# Patient Record
Sex: MALE | URBAN - METROPOLITAN AREA
[De-identification: names, ages, dates, MRNs, and addresses within clinical notes are randomized per-mention and may not be internally consistent; named-entity substitution may affect disease eponyms.]

---

## 2022-03-01 ENCOUNTER — APPOINTMENT (OUTPATIENT)
Dept: URBAN - METROPOLITAN AREA CLINIC 232 | Age: 27
Setting detail: DERMATOLOGY
End: 2022-03-01

## 2022-03-01 DIAGNOSIS — L94.2 CALCINOSIS CUTIS: ICD-10-CM

## 2022-03-01 PROCEDURE — OTHER COUNSELING: OTHER

## 2022-03-01 PROCEDURE — 99202 OFFICE O/P NEW SF 15 MIN: CPT

## 2022-03-01 PROCEDURE — OTHER MIPS QUALITY: OTHER

## 2022-03-01 PROCEDURE — OTHER ADDITIONAL NOTES: OTHER

## 2022-03-01 ASSESSMENT — LOCATION SIMPLE DESCRIPTION DERM: LOCATION SIMPLE: GENITALIA

## 2022-03-01 ASSESSMENT — LOCATION DETAILED DESCRIPTION DERM: LOCATION DETAILED: GENITALIA

## 2022-03-01 ASSESSMENT — LOCATION ZONE DERM: LOCATION ZONE: GENITALIA

## 2022-03-01 NOTE — PROCEDURE: ADDITIONAL NOTES
Additional Notes: Patient will has an appointment with Dr. Weiss
Render Risk Assessment In Note?: no
Detail Level: Simple